# Patient Record
Sex: FEMALE | Race: OTHER
[De-identification: names, ages, dates, MRNs, and addresses within clinical notes are randomized per-mention and may not be internally consistent; named-entity substitution may affect disease eponyms.]

---

## 2019-02-05 ENCOUNTER — HOSPITAL ENCOUNTER (OUTPATIENT)
Dept: HOSPITAL 62 - CCC | Age: 84
End: 2019-02-05
Payer: COMMERCIAL

## 2019-02-05 DIAGNOSIS — E11.8: ICD-10-CM

## 2019-02-05 DIAGNOSIS — Z00.00: Primary | ICD-10-CM

## 2019-02-05 LAB
ALBUMIN SERPL-MCNC: 4 G/DL (ref 3.5–5)
ALP SERPL-CCNC: 122 U/L (ref 38–126)
ALT SERPL-CCNC: 25 U/L (ref 9–52)
ANION GAP SERPL CALC-SCNC: 10 MMOL/L (ref 5–19)
AST SERPL-CCNC: 21 U/L (ref 14–36)
BILIRUB DIRECT SERPL-MCNC: 0.1 MG/DL (ref 0–0.4)
BILIRUB SERPL-MCNC: 0.4 MG/DL (ref 0.2–1.3)
BUN SERPL-MCNC: 20 MG/DL (ref 7–20)
CALCIUM: 9.1 MG/DL (ref 8.4–10.2)
CHLORIDE SERPL-SCNC: 102 MMOL/L (ref 98–107)
CO2 SERPL-SCNC: 26 MMOL/L (ref 22–30)
GLUCOSE SERPL-MCNC: 229 MG/DL (ref 75–110)
POTASSIUM SERPL-SCNC: 5.4 MMOL/L (ref 3.6–5)
PROT SERPL-MCNC: 6.5 G/DL (ref 6.3–8.2)
SODIUM SERPL-SCNC: 138 MMOL/L (ref 137–145)

## 2019-02-05 PROCEDURE — 84443 ASSAY THYROID STIM HORMONE: CPT

## 2019-02-05 PROCEDURE — 80053 COMPREHEN METABOLIC PANEL: CPT

## 2019-02-05 PROCEDURE — 36415 COLL VENOUS BLD VENIPUNCTURE: CPT

## 2019-02-05 PROCEDURE — 83036 HEMOGLOBIN GLYCOSYLATED A1C: CPT

## 2020-12-19 ENCOUNTER — HOSPITAL ENCOUNTER (EMERGENCY)
Dept: HOSPITAL 62 - ER | Age: 85
Discharge: HOME | End: 2020-12-19
Payer: SELF-PAY

## 2020-12-19 VITALS — SYSTOLIC BLOOD PRESSURE: 138 MMHG | DIASTOLIC BLOOD PRESSURE: 55 MMHG

## 2020-12-19 DIAGNOSIS — E11.9: ICD-10-CM

## 2020-12-19 DIAGNOSIS — R41.82: ICD-10-CM

## 2020-12-19 DIAGNOSIS — N39.0: Primary | ICD-10-CM

## 2020-12-19 DIAGNOSIS — R31.9: ICD-10-CM

## 2020-12-19 DIAGNOSIS — R01.1: ICD-10-CM

## 2020-12-19 DIAGNOSIS — R53.1: ICD-10-CM

## 2020-12-19 LAB
ADD MANUAL DIFF: NO
ALBUMIN SERPL-MCNC: 3.7 G/DL (ref 3.5–5)
ALP SERPL-CCNC: 111 U/L (ref 38–126)
ANION GAP SERPL CALC-SCNC: 7 MMOL/L (ref 5–19)
APPEARANCE UR: (no result)
APTT PPP: YELLOW S
AST SERPL-CCNC: 32 U/L (ref 14–36)
BASOPHILS # BLD AUTO: 0 10^3/UL (ref 0–0.2)
BASOPHILS NFR BLD AUTO: 0.5 % (ref 0–2)
BILIRUB DIRECT SERPL-MCNC: 0.1 MG/DL (ref 0–0.4)
BILIRUB SERPL-MCNC: 0.5 MG/DL (ref 0.2–1.3)
BILIRUB UR QL STRIP: NEGATIVE
BUN SERPL-MCNC: 14 MG/DL (ref 7–20)
CALCIUM: 8.6 MG/DL (ref 8.4–10.2)
CHLORIDE SERPL-SCNC: 97 MMOL/L (ref 98–107)
CO2 SERPL-SCNC: 29 MMOL/L (ref 22–30)
EOSINOPHIL # BLD AUTO: 0 10^3/UL (ref 0–0.6)
EOSINOPHIL NFR BLD AUTO: 0 % (ref 0–6)
ERYTHROCYTE [DISTWIDTH] IN BLOOD BY AUTOMATED COUNT: 12.3 % (ref 11.5–14)
GLUCOSE SERPL-MCNC: 327 MG/DL (ref 75–110)
GLUCOSE UR STRIP-MCNC: >=500 MG/DL
HCT VFR BLD CALC: 36.7 % (ref 36–47)
HGB BLD-MCNC: 12.6 G/DL (ref 12–15.5)
KETONES UR STRIP-MCNC: NEGATIVE MG/DL
LYMPHOCYTES # BLD AUTO: 0.6 10^3/UL (ref 0.5–4.7)
LYMPHOCYTES NFR BLD AUTO: 7.1 % (ref 13–45)
MCH RBC QN AUTO: 29.4 PG (ref 27–33.4)
MCHC RBC AUTO-ENTMCNC: 34.2 G/DL (ref 32–36)
MCV RBC AUTO: 86 FL (ref 80–97)
MONOCYTES # BLD AUTO: 0.6 10^3/UL (ref 0.1–1.4)
MONOCYTES NFR BLD AUTO: 7.4 % (ref 3–13)
NEUTROPHILS # BLD AUTO: 7.2 10^3/UL (ref 1.7–8.2)
NEUTS SEG NFR BLD AUTO: 85 % (ref 42–78)
NITRITE UR QL STRIP: POSITIVE
PH UR STRIP: 7 [PH] (ref 5–9)
PLATELET # BLD: 499 10^3/UL (ref 150–450)
POTASSIUM SERPL-SCNC: 4.4 MMOL/L (ref 3.6–5)
PROT SERPL-MCNC: 7.4 G/DL (ref 6.3–8.2)
PROT UR STRIP-MCNC: 100 MG/DL
RBC # BLD AUTO: 4.27 10^6/UL (ref 3.72–5.28)
SP GR UR STRIP: 1.01
TOTAL CELLS COUNTED % (AUTO): 100 %
UROBILINOGEN UR-MCNC: NEGATIVE MG/DL (ref ?–2)
WBC # BLD AUTO: 8.5 10^3/UL (ref 4–10.5)

## 2020-12-19 PROCEDURE — 81001 URINALYSIS AUTO W/SCOPE: CPT

## 2020-12-19 PROCEDURE — 87086 URINE CULTURE/COLONY COUNT: CPT

## 2020-12-19 PROCEDURE — 70450 CT HEAD/BRAIN W/O DYE: CPT

## 2020-12-19 PROCEDURE — 93005 ELECTROCARDIOGRAM TRACING: CPT

## 2020-12-19 PROCEDURE — 93010 ELECTROCARDIOGRAM REPORT: CPT

## 2020-12-19 PROCEDURE — 80053 COMPREHEN METABOLIC PANEL: CPT

## 2020-12-19 PROCEDURE — 87186 SC STD MICRODIL/AGAR DIL: CPT

## 2020-12-19 PROCEDURE — 71045 X-RAY EXAM CHEST 1 VIEW: CPT

## 2020-12-19 PROCEDURE — 99285 EMERGENCY DEPT VISIT HI MDM: CPT

## 2020-12-19 PROCEDURE — 87088 URINE BACTERIA CULTURE: CPT

## 2020-12-19 PROCEDURE — 84484 ASSAY OF TROPONIN QUANT: CPT

## 2020-12-19 PROCEDURE — 96361 HYDRATE IV INFUSION ADD-ON: CPT

## 2020-12-19 PROCEDURE — 85025 COMPLETE CBC W/AUTO DIFF WBC: CPT

## 2020-12-19 PROCEDURE — 96365 THER/PROPH/DIAG IV INF INIT: CPT

## 2020-12-19 PROCEDURE — 36415 COLL VENOUS BLD VENIPUNCTURE: CPT

## 2020-12-19 NOTE — ER DOCUMENT REPORT
ED Medical Screen (RME)





- General


Chief Complaint: Altered Mental Status


Stated Complaint: ALTERED MENTAL STATUS


Time Seen by Provider: 12/19/20 17:08


Primary Care Provider: 


[facility] [Primary Care Provider] - Follow up as needed


Mode of Arrival: Wheelchair


Information source: Relative


Notes: 





Patient is a 86-year-old female was brought into emergency room by her daughter 

with complaint of "altered mental status".  Daughter states that for the past 2 

weeks patient started to go downhill.  She has seen her primary doctor that she 

has not gone to the urgent care clinic which states they cannot find a thing 

wrong with her.  Daughter states that she has been falling recently but has not 

hit her head she is not on any blood thinners but does have a long history of 

pretty severe dementia.  Daughter states that she is here just to have her 

checked out to see if we can find anything wrong with her.  She states that she 

is a diabetic with her sugars running around 300 but she is 

non-insulin-dependent.  She denies any nausea or vomiting but she is not wanting

to eat.  Daughter states that she believes her urinations are normal she has had

no chest pain or shortness of breath but she seems to be dwindling.





Physical exam shows patient to be a well-nourished well-developed 86-year-old 

female who is moderately confused but follows commands without any difficulties.


Cardiac: Patient shows a regular rate and rhythm on monitor of 92 bpm with no 

murmur auscultated.


Lungs: Bilateral breath sounds decreased throughout no rhonchi rales or wheeze.


Abdomen: Examination of the abdomen shows to have a very rotund appearance with 

moderate discomfort to palpation suprapubically.


Neuro: Very difficult to do a neuro exam at this time patient has a history of 

dementia she does follow commands but is unaware of her age etc.





I have greeted and performed a rapid initial assessment of this patient.  A 

comprehensive ED assessment and evaluation of the patient, analysis of test 

results and completion of the medical decision making process will be conducted 

by additional ED providers.  Dictation of this chart was performed using voice 

recognition software; therefore, there may be some unintended grammatical 

errors.


TRAVEL OUTSIDE OF THE [place] IN LAST 30 DAYS: No





- Related Data


Allergies/Adverse Reactions: 


                                        





No Known Allergies Allergy (Unverified 01/19/13 15:14)


   











Past Medical History


Endocrine Medical History: Reports: Hx Diabetes Mellitus Type 2





Physical Exam





- Vital signs


Vitals: 





                                        











Temp Pulse Resp BP Pulse Ox


 


 98.5 F   92   16   166/72 H  95 


 


 12/19/20 16:59  12/19/20 16:59  12/19/20 16:59  12/19/20 16:59  12/19/20 16:59














Course





- Vital Signs


Vital signs: 





                                        











Temp Pulse Resp BP Pulse Ox


 


 98.5 F   92   16   166/72 H  95 


 


 12/19/20 16:59  12/19/20 16:59  12/19/20 16:59  12/19/20 16:59  12/19/20 16:59














Doctor's Discharge





- Discharge


Referrals: 


[facility] [Primary Care Provider] - Follow up as needed

## 2020-12-19 NOTE — RADIOLOGY REPORT (SQ)
EXAM DESCRIPTION:  CHEST SINGLE VIEW



IMAGES COMPLETED DATE/TIME:  12/19/2020 5:56 pm



REASON FOR STUDY:  Altered mental status



COMPARISON:  1/19/2013.



NUMBER OF VIEWS:  One view.



TECHNIQUE:  Single frontal radiographic view of the chest acquired.



LIMITATIONS:  None.



FINDINGS:  LUNGS AND PLEURA: No opacities, masses or pneumothorax. No pleural effusion. Attenuated bl
ood vessels and flattened dona-diaphragms.

MEDIASTINUM AND HILAR STRUCTURES: No masses.  Contour normal.

HEART AND VASCULAR STRUCTURES: Heart normal in size.  Normal vasculature.

BONES: No acute findings.

HARDWARE: None in the chest.

OTHER: No other significant finding.



IMPRESSION:  COPD.  NO ACUTE RADIOGRAPHIC FINDING IN THE CHEST.



TECHNICAL DOCUMENTATION:  JOB ID:  [id number]

 2011 "[employer]- All Rights Reserved



Reading location - IP/workstation name: [email or web address]

## 2020-12-19 NOTE — RADIOLOGY REPORT (SQ)
EXAM DESCRIPTION:  CT HEAD WITHOUT



IMAGES COMPLETED DATE/TIME:  12/19/2020 5:48 pm



REASON FOR STUDY:  Altered mental



COMPARISON:  None.



TECHNIQUE:  Axial images acquired through the brain without intravenous contrast.  Images reviewed wi
th bone, brain and subdural windows.  Additional sagittal and coronal reconstructions were generated.
 Images stored on PACS.

All CT scanners at this facility use dose modulation, iterative reconstruction, and/or weight based d
osing when appropriate to reduce radiation dose to as low as reasonably achievable (ALARA).

[facility]: Dose Right  [facility]: CareDose    [facility]: Dose Right    [facility]: Teradose 4D    [facility]: Smart Sky[facility] Records



RADIATION DOSE:  CT Rad equipment meets quality standard of care and radiation dose reduction techniq
ues were employed. CTDIvol: 48.9 mGy. DLP: 861 mGy-cm.mGy.



LIMITATIONS:  None.



FINDINGS:  VENTRICLES: Prominent.

CEREBRUM: No masses.  No hemorrhage.  No midline shift.  Areas of low density in the white matter mos
t likely due to chronic micro-vascular ischemic change.  No evidence for acute infarction.

CEREBELLUM: No masses.  No hemorrhage.  No alteration of density.  No evidence for acute infarction.

EXTRAAXIAL SPACES: Age-related involutional change.  No fluid collections.  No masses.

ORBITS AND GLOBE: No intra- or extraconal masses.  Normal contour of globe without masses.

CALVARIUM: No fracture.

PARANASAL SINUSES: No fluid or mucosal thickening.

SOFT TISSUES: No mass or hematoma.

OTHER: No other significant finding.



IMPRESSION:  CHRONIC CHANGES OF ATROPHY AND MICROVASCULAR ISCHEMIA.  NO ACUTE PROCESS.

EVIDENCE OF ACUTE STROKE: NO.



TECHNICAL DOCUMENTATION:  JOB ID:  [id number]

Quality ID # [id number]: Final reports with documentation of one or more dose reduction techniques (e.g., Au
tomated exposure control, adjustment of the mA and/or kV according to patient size, use of iterative 
reconstruction technique)

 2011 [employer]- All Rights Reserved



Reading location - IP/workstation name: [email or web address]

## 2020-12-19 NOTE — ER DOCUMENT REPORT
ED General





- General


Chief Complaint: Altered Mental Status


Stated Complaint: ALTERED MENTAL STATUS


Time Seen by Provider: 20 17:08


Primary Care Provider: 


[facility] [NO LOCAL MD] - Follow up as needed


Mode of Arrival: Wheelchair


TRAVEL OUTSIDE OF THE [place] IN LAST 30 DAYS: No





- HPI


Context: 





Time:








Chief Complaint: [Altered mental status]





[86-year-old female presents with her daughter for evaluation of altered mental 

status.  Patient's daughter states that patient lives with her and for the past 

2 weeks she seems weak and fatigued and "not herself."  Daughter states that the

 patient started having some urinary incontinence about a week ago and she 

started having the patient wear diapers because she was "making a mess."  

Patient states she took the patient to a urgent care approximately a week ago 

where they did a dipstick urinalysis which was negative for UTI.  Daughter 

denies patient complaining of chest pain or shortness of breath.  Daughter 

denies fever, productive cough.         ]





History obtained from [patient's daughter]


Symptoms began:[2 weeks ago]


Onset: [Gradual]


Timing: [Gradual]


Quality: [Generalized weakness]


Intensity: [Severe]





Location: [Generalized]


Radiation: [Daughter denies]


[The pain does not migrate to a new location.]





Aggravating factors: [none]


Relieving factors: [none]





[Denies] SOB


[Denies] nausea


[Denies] vomiting


[Denies] sweats


[Denies] fever


[Denies] cough


[Denies] calf or leg swelling or pain











- Related Data


Allergies/Adverse Reactions: 


                                        





No Known Allergies Allergy (Unverified 13 15:14)


   











Past Medical History





- General


Information source: Patient, Relative





- Social History


Smoking Status: Never Smoker


Chew tobacco use (# tins/day): No


Frequency of alcohol use: None


Drug Abuse: None


Lives with: Family


Family History: Reviewed & Not Pertinent


Patient has homicidal ideation: No


Endocrine Medical History: Reports: Hx Diabetes Mellitus Type 2





Review of Systems





- Review of Systems


Notes: 





Review of systems as below unless otherwise stated in HPI.


CONSTITUTIONAL 


[No] fever, [No] chills.  Positive generalized weakness


EYES 


[No] eye pain. 


ENT 


[No] URI symptoms, [No] sore throat, [No] ear pain.


CARDIOVASCULAR 


[No] chest pain, [No] palpitations, [No] edema.


RESPIRATORY 


[No] Cough, [No] SOB, [No] wheezing. 


GASTROINTESTINAL 


[No] abdominal pain, [No] nausea, [No] Diarrhea, [No] Vomiting, [No] 

constipation, [No] melena, [No] rectal bleeding.


GENITOURINARY 


[No] dysuria, [No] urinary frequency, [No] hematuria, [No] urinary urgency, [No]

 vaginal discharge, [No] vaginal bleeding.  Positive urinary incontinence


MUSCULOSKELETAL 


[No] Back pain. 


SKIN 


[No] Rash.


NEUROLOGIC 


[No] Headache, [No] recent seizures, [No] paralysis,[No] parathesias. 


ENDOCRINE 


[No] polyuria. 


HEMO/LYMPATIC 


[No] easy brusing


PSYCHIATRIC 


[No] depression.














Physical Exam





- Vital signs


Vitals: 


                                        











Temp Pulse Resp BP Pulse Ox


 


 98.5 F   92   16   166/72 H  95 


 


 20 16:59  20 16:59  20 16:59  20 16:59  20 16:59














- Notes


Notes: 





CONSTITUTIONAL 


[Vital signs reviewed, patient is awake, lying in bed, appears fatigued, poor 

eye contact, nontoxic appearance]


HEAD 


[Atraumatic, Normocephalic.]


EYES 


[Eyes are normal to inspection, No discharge from eyes, Extraocular muscles 

intact, Sclera are normal, Conjunctiva are normal.]


ENT 


[External ears normal to inspection, Nose examination normal, Mouth normal to 

inspection.]


NECK 


[Normal ROM, No jugular venous distention, No meningeal signs, ]


RESPIRATORY CHEST 


[Chest is nontender, Breath sounds normal, No respiratory distress.]


CARDIOVASCULAR 


[RRR, positive systolic murmur 3 out of 6, Normal S1 S2, No rub, No gallop.]


ABDOMEN 


[Abdomen is nontender, No pulsatile masses, No other masses, Bowel sounds 

normal, No distension, No peritoneal signs, No hernias.]


BACK 


[There is no CVA Tenderness, There is no tenderness to palpation, Normal 

inspection.]


UPPER EXTREMITY 


[Inspection normal, No cyanosis, No clubbing, No edema, 


LOWER EXTREMITY 


[Inspection normal, No cyanosis, No clubbing, No edema, No calf tenderness,


NEURO 


[No focal motor deficits, No focal sensory deficits, Speech normal.]


SKIN 


[Skin is warm, Skin is dry, Skin is normal color.]


PSYCHIATRIC 


[Normal affect. ]





Course





- Re-evaluation


Re-evalutation: 





20 23:30


Results of ED MSE discussed with patient and patient's daughter.  Patient's 

daughter states that her mother appears to have better color and seems more 

alert.  All questions were answered prior to discharge.  Emergency signs and 

symptoms, reasons to return to the emergency department discussed with patient 

and patient's daughter.





- Vital Signs


Vital signs: 


                                        











Temp Pulse Resp BP Pulse Ox


 


 98.5 F   83   15   152/74 H  98 


 


 20 16:59  20 19:00  20 22:02  20 22:02  20 22:02














- Laboratory Results


Result Diagrams: 


                                 20 20:15





                                 20 20:15


Laboratory Results Interpreted: 


                                        











  20





  20:15 20:15 20:15


 


Plt Count  499 H  


 


Lymph % (Auto)  7.1 L  


 


Seg Neutrophils %  85.0 H  


 


Sodium   133.0 L 


 


Chloride   97 L 


 


Glucose   327 H 


 


Urine Protein    100 H


 


Urine Glucose (UA)    >=500 H


 


Urine Blood    SMALL H


 


Urine Nitrite    POSITIVE H


 


Ur Leukocyte Esterase    LARGE H











Critical Laboratory Results Reviewed: Yes


Attending or Supervising Physician who Reviewed Labs: JULIANA CASTRO IV - 

Abnormal urinalysis





- Radiology Results


Critical Radiology Results Reviewed: No Critical Results


Attending or Supervising Physician who Reviewed Radiology: JULIANA CASTRO IV





- EKG Interpretation by Me


Additional EKG results interpreted by me: 





20 20:39


EKG obtained on 2020 at 1800 hrs. was interpreted by this MD.  Findings: 

Normal sinus rhythm, rate 82, left axis deviation is present, [place] interval 

appears to be within normal limits, P waves preceding QRS complexes, QRS 

complexes appear narrow, QTC is 491, there are no obvious patterns of ST segment

elevation, depression or reciprocal changes seen to suggest acute myocardial 

ischemia or infarction.  When compared with prior EKG from 2013 the 

morphology of the 2 EKGs is grossly the same.  Impression: Normal sinus rhythm 

with nonspecific ST segments





Discharge





- Discharge


Clinical Impression: 


UTI (urinary tract infection)


Qualifiers:


 Urinary tract infection type: site unspecified Hematuria presence: with 

hematuria Qualified Code(s): N39.0 - Urinary tract infection, site not specified





Condition: Stable


Disposition: HOME, SELF-CARE


Instructions:  Nitrofurantoin ([facility]), Urinary Tract Infection ([facility])


Additional Instructions: 


Return to the Emergency Department without delay if any worse.











HOME CARE INSTRUCTIONS & INFORMATION:  Thank you for choosing us for your 

medical needs. We hope you're satisfied with the care you received.  After you 

leave, you must properly care for your problem and, at the same time, observe 

its progress.  Any condition can change.  Some illnesses can change rapidly over

hours or days.  If your condition worsens, return to the Emergency Department or

see your physician promptly.





ABOUT YOUR X-RAYS AND EKG'S:   If you had an EKG or X-rays taken, they have been

read by the Emergency Physician. The X-rays and EKG's will also be read by a 

Radiologist or Cardiologist within 24 hours.  If discrepancies are noted, you 

will be notified by telephone.  Please be certain the ED has a correct telephone

number & address where you can be reached.  Also, realize that some fractures or

abnormalities do not show up on initial X-rays.  If your symptoms continue, see 

your physician.





ABOUT YOUR LABORATORY TEST:   If you had laboratory tests, the results have been

reviewed by the Emergency Physician.  Some test results (for example cultures) 

may not be available for several days.  You will be contacted if any test result

shows you need additional treatment.  Please be certain the ED has a correct [employer] number and address where you can be reached.





ABOUT YOUR MEDICATIONS:  You will receive instructions on how to take your 

medicine on the prescription label you receive.  Additional information may be 

provided by the Pharmacy.  If you have questions afterwards, call the ED for 

clarification or further instructions.  Some prescribed medications may cause 

drowsiness.  Do not perform tasks such as driving a car or operating machinery 

without consulting your Pharmacist.  If you feel you need a refill of pain 

medication, your condition will need re-evaluation.  Please do not call for a 

refill of any medication.





ABOUT YOUR SIGNATURE:   Signature of this document acknowledges to followin. Understanding that you received emergency treatment and that you may be 

   released before al medical problems are known or treated. Please be certain  

   the ED has a correct phone number & address where you can be reached.


   2. Acknowledgement that you will arrange for follow-up care as recommended.


   3. Authorization for the Emergency Physician to provide information to your 

follow-up Physician in order to maximize your care.





AT ANY TIME, IF YOUR SYMPTOMS CHANGE SIGNIFICANTLY OR WORSEN OR YOU DEVELOP NEW 

SYMPTOMS, RETURN TO THE EMERGENCY DEPARTMENT IMMEDIATELY FOR RE-EVALUATION.





OUR GOAL IS TO PROVIDE EXCELLENT MEDICAL CARE!





WE HOPE THAT WE HAVE MET YOUR EXPECTATIONS DURING YOUR EMERGENCY DEPARTMENT 

VISIT AND THAT YOU FEEL YOU HAVE RECEIVED EXCELLENT CARE!











Prescriptions: 


Nitrofurantoin Monohyd/M-Cryst [Macrobid 100 mg Capsule] 100 mg PO BID 10 Days 

#20 cap


Referrals: 


[facility] [NO LOCAL MD] - Follow up as needed

## 2021-01-29 ENCOUNTER — HOSPITAL ENCOUNTER (OUTPATIENT)
Dept: HOSPITAL 62 - OD | Age: 86
End: 2021-01-29
Attending: FAMILY MEDICINE
Payer: COMMERCIAL

## 2021-01-29 DIAGNOSIS — E11.9: Primary | ICD-10-CM

## 2021-01-29 LAB
ADD MANUAL DIFF: NO
ALBUMIN SERPL-MCNC: 4 G/DL (ref 3.5–5)
ALP SERPL-CCNC: 87 U/L (ref 38–126)
ANION GAP SERPL CALC-SCNC: 8 MMOL/L (ref 5–19)
AST SERPL-CCNC: 33 U/L (ref 14–36)
BASOPHILS # BLD AUTO: 0 10^3/UL (ref 0–0.2)
BASOPHILS NFR BLD AUTO: 0.5 % (ref 0–2)
BILIRUB DIRECT SERPL-MCNC: 0.1 MG/DL (ref 0–0.4)
BILIRUB SERPL-MCNC: 0.4 MG/DL (ref 0.2–1.3)
BUN SERPL-MCNC: 19 MG/DL (ref 7–20)
CALCIUM: 9.3 MG/DL (ref 8.4–10.2)
CHLORIDE SERPL-SCNC: 100 MMOL/L (ref 98–107)
CO2 SERPL-SCNC: 32 MMOL/L (ref 22–30)
EOSINOPHIL # BLD AUTO: 0.7 10^3/UL (ref 0–0.6)
EOSINOPHIL NFR BLD AUTO: 10.5 % (ref 0–6)
ERYTHROCYTE [DISTWIDTH] IN BLOOD BY AUTOMATED COUNT: 14 % (ref 11.5–14)
GLUCOSE SERPL-MCNC: 35 MG/DL (ref 75–110)
HCT VFR BLD CALC: 41.8 % (ref 36–47)
HGB BLD-MCNC: 13.6 G/DL (ref 12–15.5)
LYMPHOCYTES # BLD AUTO: 2.8 10^3/UL (ref 0.5–4.7)
LYMPHOCYTES NFR BLD AUTO: 40.6 % (ref 13–45)
MCH RBC QN AUTO: 28.3 PG (ref 27–33.4)
MCHC RBC AUTO-ENTMCNC: 32.6 G/DL (ref 32–36)
MCV RBC AUTO: 87 FL (ref 80–97)
MONOCYTES # BLD AUTO: 0.6 10^3/UL (ref 0.1–1.4)
MONOCYTES NFR BLD AUTO: 8.2 % (ref 3–13)
NEUTROPHILS # BLD AUTO: 2.7 10^3/UL (ref 1.7–8.2)
NEUTS SEG NFR BLD AUTO: 40.2 % (ref 42–78)
PLATELET # BLD: 352 10^3/UL (ref 150–450)
POTASSIUM SERPL-SCNC: 4.3 MMOL/L (ref 3.6–5)
PROT SERPL-MCNC: 7.1 G/DL (ref 6.3–8.2)
RBC # BLD AUTO: 4.82 10^6/UL (ref 3.72–5.28)
TOTAL CELLS COUNTED % (AUTO): 100 %
WBC # BLD AUTO: 6.8 10^3/UL (ref 4–10.5)

## 2021-01-29 PROCEDURE — 36415 COLL VENOUS BLD VENIPUNCTURE: CPT

## 2021-01-29 PROCEDURE — 80053 COMPREHEN METABOLIC PANEL: CPT

## 2021-01-29 PROCEDURE — 85025 COMPLETE CBC W/AUTO DIFF WBC: CPT

## 2021-01-29 PROCEDURE — 83036 HEMOGLOBIN GLYCOSYLATED A1C: CPT

## 2024-11-18 NOTE — EKG REPORT
Duplicate message, sent original message to PCP   SEVERITY:- ABNORMAL ECG -

SINUS RHYTHM

PROBABLE LEFT ATRIAL ABNORMALITY

LEFT AXIS DEVIATION

LEFT VENTRICULAR HYPERTROPHY

BORDERLINE PROLONGED QT INTERVAL

:

Confirmed by: Lizy Garay MD 20-Dec-2020 12:17:16